# Patient Record
(demographics unavailable — no encounter records)

---

## 2024-11-18 NOTE — ASSESSMENT
[FreeTextEntry1] : Assessment and plan:  1.  Urticaria physical exam was unremarkable I recommend Benadryl for the acute episode, triamcinolone cream and patient will notify me if he has any further episodes.  2.  Edema bilateral lower extremities compared to previous exam much worsened it appears that the patient is retaining quite a bit of fluid I recommend he continue furosemide with potassium replacement.  I discussed with patient diet he should be on a DASH diet but the patient states with his profession he eats out almost every day.  I told the patient to take lunch with him since he cannot find the appropriate foods.  3.  Chronic pain syndrome secondary to chronic back pain which is multifactorial patient has lumbar disc disease and radiculopathy.  I stop checked there has been no sign of abuse or doctor shopping patient will continue oxycodone acetaminophen 10-3 25 to take as needed.  4.  Constipation well-controlled with Linzess.  5.  Diabetes mellitus type 2 presently on metformin 1000 mg twice a day with food and Ozempic 2 mg weekly.  6.  Hyper lipidemia which is mixed patient will continue rosuvastatin 40 mg with fenofibrate 134 mg daily patient follows a low-fat low-cholesterol diet.  7.  Elevated BMI patient has gained 12 pounds since his last visit patient did admit to noncompliance.  8.  Hypertension Blood pressure 1 first taken was slightly elevated repeat blood pressure 138/72  recommendations are to continue present medical management and lifestyle changes  9.  Comprehensive blood work drawn in office by examiner.  10.  Influenza vaccine offered patient declined.  Total time spent face-to-face and non-face-to-face time 45 minutes the majority of which was spent on counseling and coordination of care.

## 2024-11-18 NOTE — HISTORY OF PRESENT ILLNESS
[FreeTextEntry1] : Follow-up, disease management most recently patient developed whole body hives patient has no recollection of anything new that he was possibly using such as soap laundry detergent aftershave etc. [de-identified] : Patient is a 54-year-old gentleman who presents today for follow-up and disease management. Medical history is significant for degenerative joint disease, diabetes mellitus type 2 unfortunately his most recent hemoglobin A1c was 9  patient was started on Ozempic, chronic pain secondary to degenerative joint disease, generalized anxiety disorder, hyperlipidemia and elevated BMI.

## 2024-11-18 NOTE — REVIEW OF SYSTEMS
[Fatigue] : fatigue [Lower Ext Edema] : lower extremity edema [Joint Pain] : joint pain [Joint Stiffness] : joint stiffness [Muscle Pain] : muscle pain [Back Pain] : back pain [Joint Swelling] : joint swelling [Itching] : itching [Skin Rash] : skin rash [Anxiety] : anxiety [Negative] : Heme/Lymph [Chest Pain] : no chest pain [Palpitations] : no palpitations [Claudication] : no  leg claudication [Orthopena] : no orthopnea [Muscle Weakness] : no muscle weakness [Mole Changes] : no mole changes [Nail Changes] : no nail changes [Hair Changes] : no hair changes [Suicidal] : not suicidal [Insomnia] : no insomnia [Depression] : no depression [de-identified] : Paresthesia, painful neuropathy bilateral lower extremities especially of feet

## 2024-11-18 NOTE — PHYSICAL EXAM
[No Acute Distress] : no acute distress [Well Nourished] : well nourished [Well Developed] : well developed [Well-Appearing] : well-appearing [Normal Voice/Communication] : normal voice/communication [Normal Outer Ear/Nose] : the outer ears and nose were normal in appearance [Normal Rate] : normal rate  [Regular Rhythm] : with a regular rhythm [Normal S1, S2] : normal S1 and S2 [Non Tender] : non-tender [Normal Bowel Sounds] : normal bowel sounds [Normal] : soft, non-tender, non-distended, no masses palpated, no HSM and normal bowel sounds [No CVA Tenderness] : no CVA  tenderness [No Spinal Tenderness] : no spinal tenderness [Grossly Normal Strength/Tone] : grossly normal strength/tone [Coordination Grossly Intact] : coordination grossly intact [No Focal Deficits] : no focal deficits [Normal Gait] : normal gait [Speech Grossly Normal] : speech grossly normal [Memory Grossly Normal] : memory grossly normal [Normal Affect] : the affect was normal [Alert and Oriented x3] : oriented to person, place, and time [Normal Mood] : the mood was normal [Normal Insight/Judgement] : insight and judgment were intact [de-identified] :  edema bilateral lower extremities, no calf pain and no palpable cord [de-identified] : obese [de-identified] : Left Baker's cyst decreased in size, left knee exquisitely tender medial aspect questionable meniscal tear [de-identified] : Facial dermatitis

## 2024-11-18 NOTE — HEALTH RISK ASSESSMENT
[Never (0 pts)] : Never (0 points) [No] : In the past 12 months have you used drugs other than those required for medical reasons? No [No falls in past year] : Patient reported no falls in the past year [Little interest or pleasure doing things] : 1) Little interest or pleasure doing things [0] : 1) Little interest or pleasure doing things: Not at all (0) [Feeling down, depressed, or hopeless] : 2) Feeling down, depressed, or hopeless [1] : 2) Feeling down, depressed, or hopeless for several days (1) [PHQ-2 Negative - No further assessment needed] : PHQ-2 Negative - No further assessment needed [With Patient/Caregiver] : , with patient/caregiver [Reviewed no changes] : Reviewed, no changes [Designated Healthcare Proxy] : Designated healthcare proxy [Name: ___] : Health Care Proxy's Name: [unfilled]  [Relationship: ___] : Relationship: [unfilled] [Aggressive treatment] : aggressive treatment [I will adhere to the patient's wishes.] : I will adhere to the patient's wishes. [Former] : Former [20 or more] : 20 or more [> 15 Years] : > 15 Years [Audit-CScore] : 0 [[id number]] : 1 [AdvancecareDate] : 11/24

## 2024-12-30 NOTE — CURRENT MEDS
[Other ___] : [unfilled] [Yes] : Reviewed medication list for presence of high-risk medications. [Benzodiazepines] : benzodiazepines [Opioids] : opioids [Takes medication as prescribed] : does not take

## 2024-12-30 NOTE — ASSESSMENT
[FreeTextEntry1] : Assessment and plan:  1.  Urticaria compared to previous resolved.  Patient does have dry skin dermatitis especially in the facial area recommendations he can attempt over-the-counter preparations such as Dede if that does not work low potency cortisone.  2.  Edema bilateral lower extremities compared to previous exam much worsened it appears that the patient is retaining quite a bit of fluid I recommend he continue furosemide with potassium replacement.  I discussed with patient diet he should be on a DASH diet but the patient states with his profession he eats out almost every day.  I told the patient to take lunch with him since he cannot find the appropriate foods.  3.  Chronic pain syndrome secondary to chronic back pain which is multifactorial patient has lumbar disc disease and radiculopathy.  I stop checked there has been no sign of abuse or doctor shopping patient will continue oxycodone acetaminophen 10-3 25 to take as needed.  4.  Constipation well-controlled with Linzess.  5.  Diabetes mellitus type 2 presently on metformin 1000 mg twice a day with food and Ozempic 2 mg weekly.  6.  Hyper lipidemia which is mixed patient will continue rosuvastatin 40 mg with fenofibrate 134 mg daily patient follows a low-fat low-cholesterol diet.  7.  Elevated BMI patient has gained 12 pounds since his last visit patient did admit to noncompliance.  8.  Hypertension Blood pressure 1 first taken was slightly elevated repeat blood pressure 138/72  recommendations are to continue present medical management and lifestyle changes  9.  Comprehensive blood work drawn in office by examiner.  10.  Herpes labialis attempt valacyclovir.  Total time spent face-to-face and non-face-to-face time 45 minutes the majority of which was spent on counseling and coordination of care.

## 2024-12-30 NOTE — HEALTH RISK ASSESSMENT
[No] : In the past 12 months have you used drugs other than those required for medical reasons? No [No falls in past year] : Patient reported no falls in the past year [Little interest or pleasure doing things] : 1) Little interest or pleasure doing things [Feeling down, depressed, or hopeless] : 2) Feeling down, depressed, or hopeless [0] : 2) Feeling down, depressed, or hopeless: Not at all (0) [PHQ-2 Negative - No further assessment needed] : PHQ-2 Negative - No further assessment needed [With Patient/Caregiver] : , with patient/caregiver [Reviewed no changes] : Reviewed, no changes [Designated Healthcare Proxy] : Designated healthcare proxy [Name: ___] : Health Care Proxy's Name: [unfilled]  [Relationship: ___] : Relationship: [unfilled] [Aggressive treatment] : aggressive treatment [I will adhere to the patient's wishes.] : I will adhere to the patient's wishes. [Former] : Former [< 15 Years] : < 15 Years [Audit-CScore] : 0 [[id number]] : 0 [AdvancecareDate] : 12/24 [de-identified] : Patient used to use chewing tobacco

## 2024-12-30 NOTE — REVIEW OF SYSTEMS
[Fatigue] : fatigue [Lower Ext Edema] : lower extremity edema [Joint Pain] : joint pain [Joint Stiffness] : joint stiffness [Muscle Pain] : muscle pain [Back Pain] : back pain [Joint Swelling] : joint swelling [Itching] : itching [Skin Rash] : skin rash [Anxiety] : anxiety [Negative] : Heme/Lymph [Chest Pain] : no chest pain [Palpitations] : no palpitations [Claudication] : no  leg claudication [Orthopena] : no orthopnea [Muscle Weakness] : no muscle weakness [Mole Changes] : no mole changes [Nail Changes] : no nail changes [Hair Changes] : no hair changes [Suicidal] : not suicidal [Insomnia] : no insomnia [Depression] : no depression [de-identified] : Paresthesia, painful neuropathy bilateral lower extremities especially of feet

## 2024-12-30 NOTE — COUNSELING
[Fall prevention counseling provided] : Fall prevention counseling provided [Adequate lighting] : Adequate lighting [No throw rugs] : No throw rugs [Use proper foot wear] : Use proper foot wear [Behavioral health counseling provided] : Behavioral health counseling provided [Sleep ___ hours/day] : Sleep [unfilled] hours/day [Engage in a relaxing activity] : Engage in a relaxing activity [Plan in advance] : Plan in advance [AUDIT-C Screening administered and reviewed] : AUDIT-C Screening administered and reviewed [Potential consequences of obesity discussed] : Potential consequences of obesity discussed [Benefits of weight loss discussed] : Benefits of weight loss discussed [Structured Weight Management Program suggested:] : Structured weight management program suggested [Encouraged to maintain food diary] : Encouraged to maintain food diary [Encouraged to increase physical activity] : Encouraged to increase physical activity [Encouraged to use exercise tracking device] : Encouraged to use exercise tracking device [Target Wt Loss Goal ___] : Weight Loss Goals: Target weight loss goal [unfilled] lbs [Weigh Self Weekly] : weigh self weekly [Decrease Portions] : decrease portions [____ min/wk Activity] : [unfilled] min/wk activity [Keep Food Diary] : keep food diary [Good understanding] : Patient has a good understanding of disease, goals and obesity follow-up plan [Patient motivation] : Patient motivation [FreeTextEntry1] : patient declines

## 2024-12-30 NOTE — HISTORY OF PRESENT ILLNESS
[FreeTextEntry1] : Follow-up and disease management [de-identified] : Patient is a 54-year-old gentleman who presents today for follow-up and disease management. Medical history is significant for degenerative joint disease, diabetes mellitus type 2 unfortunately his most recent hemoglobin A1c was 9.8  patient is on Ozempic, chronic pain secondary to degenerative joint disease, generalized anxiety disorder, hyperlipidemia and elevated BMI.

## 2024-12-30 NOTE — PHYSICAL EXAM
[No Acute Distress] : no acute distress [Well Nourished] : well nourished [Well Developed] : well developed [Well-Appearing] : well-appearing [Normal Voice/Communication] : normal voice/communication [Normal Outer Ear/Nose] : the outer ears and nose were normal in appearance [Normal Rate] : normal rate  [Regular Rhythm] : with a regular rhythm [Normal S1, S2] : normal S1 and S2 [Non Tender] : non-tender [Normal Bowel Sounds] : normal bowel sounds [Normal] : soft, non-tender, non-distended, no masses palpated, no HSM and normal bowel sounds [No CVA Tenderness] : no CVA  tenderness [No Spinal Tenderness] : no spinal tenderness [Grossly Normal Strength/Tone] : grossly normal strength/tone [Coordination Grossly Intact] : coordination grossly intact [No Focal Deficits] : no focal deficits [Normal Gait] : normal gait [Speech Grossly Normal] : speech grossly normal [Memory Grossly Normal] : memory grossly normal [Normal Affect] : the affect was normal [Alert and Oriented x3] : oriented to person, place, and time [Normal Mood] : the mood was normal [Normal Insight/Judgement] : insight and judgment were intact [de-identified] :  edema bilateral lower extremities, no calf pain and no palpable cord [de-identified] : obese [de-identified] : Left Baker's cyst decreased in size, left knee exquisitely tender medial aspect questionable meniscal tear [de-identified] : Facial dermatitis

## 2025-01-31 NOTE — PHYSICAL EXAM
[No Acute Distress] : no acute distress [Well Nourished] : well nourished [Well Developed] : well developed [Well-Appearing] : well-appearing [Normal Voice/Communication] : normal voice/communication [Normal Outer Ear/Nose] : the outer ears and nose were normal in appearance [Normal Rate] : normal rate  [Regular Rhythm] : with a regular rhythm [Normal S1, S2] : normal S1 and S2 [Non Tender] : non-tender [Normal Bowel Sounds] : normal bowel sounds [Normal] : soft, non-tender, non-distended, no masses palpated, no HSM and normal bowel sounds [No CVA Tenderness] : no CVA  tenderness [No Spinal Tenderness] : no spinal tenderness [Grossly Normal Strength/Tone] : grossly normal strength/tone [Coordination Grossly Intact] : coordination grossly intact [No Focal Deficits] : no focal deficits [Normal Gait] : normal gait [Speech Grossly Normal] : speech grossly normal [Memory Grossly Normal] : memory grossly normal [Normal Affect] : the affect was normal [Alert and Oriented x3] : oriented to person, place, and time [Normal Mood] : the mood was normal [Normal Insight/Judgement] : insight and judgment were intact [de-identified] :  edema bilateral lower extremities, no calf pain and no palpable cord [de-identified] : obese [de-identified] : Left Baker's cyst decreased in size, left knee exquisitely tender medial aspect questionable meniscal tear [de-identified] : Facial dermatitis

## 2025-01-31 NOTE — HISTORY OF PRESENT ILLNESS
[FreeTextEntry1] : Follow-up and disease management.  Patient complaining of forgetfulness. [de-identified] : Patient is a 54-year-old gentleman who presents today for follow-up and disease management. Medical history is significant for degenerative joint disease, diabetes mellitus type 2 unfortunately his most recent hemoglobin A1c was 9.6  patient is on Ozempic, chronic pain secondary to degenerative joint disease, generalized anxiety disorder, hyperlipidemia and elevated BMI.

## 2025-01-31 NOTE — REVIEW OF SYSTEMS
[Fatigue] : fatigue [Chest Pain] : no chest pain [Palpitations] : no palpitations [Claudication] : no  leg claudication [Lower Ext Edema] : lower extremity edema [Orthopena] : no orthopnea [Joint Pain] : joint pain [Joint Stiffness] : joint stiffness [Muscle Pain] : muscle pain [Muscle Weakness] : no muscle weakness [Back Pain] : back pain [Joint Swelling] : joint swelling [Itching] : itching [Mole Changes] : no mole changes [Nail Changes] : no nail changes [Hair Changes] : no hair changes [Skin Rash] : skin rash [Suicidal] : not suicidal [Insomnia] : no insomnia [Anxiety] : anxiety [Depression] : no depression [Negative] : Heme/Lymph [de-identified] : Paresthesia, painful neuropathy bilateral lower extremities especially of feet

## 2025-01-31 NOTE — ASSESSMENT
[FreeTextEntry1] : Assessment and plan:  1.   skin dermatitis especially in the facial area recommendations OTC moisturizers.  2.  Edema bilateral lower extremities compared to previous exam much worsened it appears that the patient is retaining quite a bit of fluid I recommend he continue furosemide with potassium replacement.  I discussed with patient diet he should be on a DASH diet but the patient states with his profession he eats out almost every day.  I told the patient to take lunch with him since he cannot find the appropriate foods.  3.  Chronic pain syndrome secondary to chronic back pain which is multifactorial patient has lumbar disc disease and radiculopathy.  I stop checked there has been no sign of abuse or doctor shopping patient will continue oxycodone acetaminophen 10-3 25 to take as needed.  4.  Constipation well-controlled with Linzess.  5.  Diabetes mellitus type 2 presently on metformin 1000 mg twice a day with food and Ozempic has been discontinued patient presently on Mounjaro hopefully the control is better than when Ozempic is most recent hemoglobin A1c 9.6.  6.  Hyper lipidemia which is mixed patient will continue rosuvastatin 40 mg with fenofibrate 134 mg daily patient follows a low-fat low-cholesterol diet.  7.  Elevated BMI patients weight has remained stable.  8.  Hypertension Blood pressure good control today's blood pressure 126/80 continue present medical management.  9.  Comprehensive blood work drawn in office by examiner.  Total time spent face-to-face and non-face-to-face time 45 minutes the majority of which was spent on counseling and coordination of care.

## 2025-01-31 NOTE — HEALTH RISK ASSESSMENT
[No] : In the past 12 months have you used drugs other than those required for medical reasons? No [No falls in past year] : Patient reported no falls in the past year [Little interest or pleasure doing things] : 1) Little interest or pleasure doing things [Feeling down, depressed, or hopeless] : 2) Feeling down, depressed, or hopeless [0] : 2) Feeling down, depressed, or hopeless: Not at all (0) [PHQ-2 Negative - No further assessment needed] : PHQ-2 Negative - No further assessment needed [Audit-CScore] : 0 [[id number]] : 0 [With Patient/Caregiver] : , with patient/caregiver [Reviewed no changes] : Reviewed, no changes [Designated Healthcare Proxy] : Designated healthcare proxy [Name: ___] : Health Care Proxy's Name: [unfilled]  [Relationship: ___] : Relationship: [unfilled] [Aggressive treatment] : aggressive treatment [I will adhere to the patient's wishes.] : I will adhere to the patient's wishes. [AdvancecareDate] : 01/25 [Former] : Former [> 15 Years] : > 15 Years

## 2025-04-07 NOTE — PHYSICAL EXAM
[No Acute Distress] : no acute distress [Well Nourished] : well nourished [Well Developed] : well developed [Well-Appearing] : well-appearing [Normal Voice/Communication] : normal voice/communication [Normal Outer Ear/Nose] : the outer ears and nose were normal in appearance [Normal Rate] : normal rate  [Regular Rhythm] : with a regular rhythm [Normal S1, S2] : normal S1 and S2 [Non Tender] : non-tender [Normal Bowel Sounds] : normal bowel sounds [Normal] : soft, non-tender, non-distended, no masses palpated, no HSM and normal bowel sounds [No CVA Tenderness] : no CVA  tenderness [No Spinal Tenderness] : no spinal tenderness [Grossly Normal Strength/Tone] : grossly normal strength/tone [Coordination Grossly Intact] : coordination grossly intact [No Focal Deficits] : no focal deficits [Normal Gait] : normal gait [Speech Grossly Normal] : speech grossly normal [Memory Grossly Normal] : memory grossly normal [Normal Affect] : the affect was normal [Alert and Oriented x3] : oriented to person, place, and time [Normal Mood] : the mood was normal [Normal Insight/Judgement] : insight and judgment were intact [de-identified] :  edema bilateral lower extremities, no calf pain and no palpable cord [de-identified] : obese [de-identified] : Left Baker's cyst decreased in size, left knee exquisitely tender medial aspect questionable meniscal tear [de-identified] : Facial dermatitis

## 2025-04-07 NOTE — HISTORY OF PRESENT ILLNESS
[FreeTextEntry1] : Patient is here for follow-up and disease management with a chief complaint of worsening lower extremity edema secondary to peripheral vascular disease.  Facial rash worsening it appears to be rosacea. [de-identified] : Patient is a 54-year-old gentleman who presents today for follow-up and disease management. Medical history is significant for degenerative joint disease, diabetes mellitus type 2 unfortunately his most recent hemoglobin A1c was 9.6  patient is on Ozempic, chronic pain secondary to degenerative joint disease, generalized anxiety disorder, hyperlipidemia and elevated BMI.

## 2025-04-07 NOTE — REVIEW OF SYSTEMS
[Fatigue] : fatigue [Lower Ext Edema] : lower extremity edema [Joint Pain] : joint pain [Joint Stiffness] : joint stiffness [Muscle Pain] : muscle pain [Back Pain] : back pain [Joint Swelling] : joint swelling [Itching] : itching [Skin Rash] : skin rash [Anxiety] : anxiety [Negative] : Heme/Lymph [Chest Pain] : no chest pain [Palpitations] : no palpitations [Claudication] : no  leg claudication [Orthopena] : no orthopnea [Muscle Weakness] : no muscle weakness [Mole Changes] : no mole changes [Nail Changes] : no nail changes [Hair Changes] : no hair changes [Suicidal] : not suicidal [Insomnia] : no insomnia [Depression] : no depression [de-identified] : Paresthesia, painful neuropathy bilateral lower extremities especially of feet

## 2025-04-07 NOTE — COUNSELING
[Fall prevention counseling provided] : Fall prevention counseling provided [Adequate lighting] : Adequate lighting [No throw rugs] : No throw rugs [Use proper foot wear] : Use proper foot wear [Behavioral health counseling provided] : Behavioral health counseling provided [Sleep ___ hours/day] : Sleep [unfilled] hours/day [Engage in a relaxing activity] : Engage in a relaxing activity [Plan in advance] : Plan in advance [AUDIT-C Screening administered and reviewed] : AUDIT-C Screening administered and reviewed [Potential consequences of obesity discussed] : Potential consequences of obesity discussed [Benefits of weight loss discussed] : Benefits of weight loss discussed [Structured Weight Management Program suggested:] : Structured weight management program suggested [Encouraged to maintain food diary] : Encouraged to maintain food diary [Encouraged to increase physical activity] : Encouraged to increase physical activity [Encouraged to use exercise tracking device] : Encouraged to use exercise tracking device [Target Wt Loss Goal ___] : Weight Loss Goals: Target weight loss goal [unfilled] lbs [Weigh Self Weekly] : weigh self weekly [Decrease Portions] : decrease portions [____ min/wk Activity] : [unfilled] min/wk activity [Keep Food Diary] : keep food diary [None] : None [Good understanding] : Patient has a good understanding of lifestyle changes and steps needed to achieve self management goal [FreeTextEntry1] : thai

## 2025-04-07 NOTE — HEALTH RISK ASSESSMENT
[Never (0 pts)] : Never (0 points) [No] : In the past 12 months have you used drugs other than those required for medical reasons? No [No falls in past year] : Patient reported no falls in the past year [Little interest or pleasure doing things] : 1) Little interest or pleasure doing things [Feeling down, depressed, or hopeless] : 2) Feeling down, depressed, or hopeless [0] : 2) Feeling down, depressed, or hopeless: Not at all (0) [PHQ-2 Negative - No further assessment needed] : PHQ-2 Negative - No further assessment needed [With Patient/Caregiver] : , with patient/caregiver [Reviewed no changes] : Reviewed, no changes [Designated Healthcare Proxy] : Designated healthcare proxy [Name: ___] : Health Care Proxy's Name: [unfilled]  [Relationship: ___] : Relationship: [unfilled] [Aggressive treatment] : aggressive treatment [I will adhere to the patient's wishes.] : I will adhere to the patient's wishes. [Former] : Former [20 or more] : 20 or more [> 15 Years] : > 15 Years [Audit-CScore] : 0 [[id number]] : 0 [AdvancecareDate] : 04/25

## 2025-04-07 NOTE — ASSESSMENT
[FreeTextEntry1] : Assessment and plan:  1.  Facial dermatitis appears to be rosacea I have recommended metronidazole we will see how if symptoms persist or change in character patient will notify me I do recommend dermatology evaluation.  2.  Edema bilateral lower extremities compared to previous exam much worsened it appears that the patient is retaining quite a bit of fluid I recommend he continue furosemide with potassium replacement.  I discussed with patient diet he should be on a DASH diet but the patient states with his profession he eats out almost every day.  I told the patient to take lunch with him since he cannot find the appropriate foods.  Referral for vascular surgery needs intubation.  3.  Chronic pain syndrome secondary to chronic back pain which is multifactorial patient has lumbar disc disease and radiculopathy.  I stop checked there has been no sign of abuse or doctor shopping patient will continue oxycodone acetaminophen 10-3 25 to take as needed.  4.  Constipation well-controlled with Linzess.  5.  Diabetes mellitus type 2 presently on metformin 1000 mg twice a day with food and Ozempic has been discontinued patient presently on Mounjaro hopefully the control is better than when Ozempic is most recent hemoglobin A1c 8.5  6.  Hyper lipidemia which is mixed patient will continue rosuvastatin 40 mg with fenofibrate 134 mg daily patient follows a low-fat low-cholesterol diet.  7.  Elevated BMI patients weight has remained stable.  Recommend patient follow-weight loss: Goal 100 pounds weight loss.  8.  Hypertension Blood pressure good control today's blood pressure 130/76 continue present medical management.  9.  Comprehensive blood work drawn in office by examiner at last visit reviewed with patient at this visit.  10.  Reconciliation of medications done at this visit.  Total time spent face-to-face and non-face-to-face time 45 minutes the majority of which was spent on counseling and coordination of care.

## 2025-07-28 NOTE — HISTORY OF PRESENT ILLNESS
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